# Patient Record
Sex: MALE | Race: BLACK OR AFRICAN AMERICAN | Employment: UNEMPLOYED | URBAN - METROPOLITAN AREA
[De-identification: names, ages, dates, MRNs, and addresses within clinical notes are randomized per-mention and may not be internally consistent; named-entity substitution may affect disease eponyms.]

---

## 2019-07-07 ENCOUNTER — HOSPITAL ENCOUNTER (EMERGENCY)
Age: 19
Discharge: HOME | End: 2019-07-07
Attending: EMERGENCY MEDICINE
Payer: COMMERCIAL

## 2019-07-07 VITALS
HEART RATE: 85 BPM | SYSTOLIC BLOOD PRESSURE: 119 MMHG | TEMPERATURE: 98.9 F | OXYGEN SATURATION: 99 % | RESPIRATION RATE: 15 BRPM | DIASTOLIC BLOOD PRESSURE: 77 MMHG | WEIGHT: 170 LBS

## 2019-07-07 DIAGNOSIS — S61.412A LACERATION OF LEFT HAND WITHOUT FOREIGN BODY, INITIAL ENCOUNTER: Primary | ICD-10-CM

## 2019-07-07 PROCEDURE — 4500000022 HC ED LEVEL 2 PROCEDURE

## 2019-07-07 PROCEDURE — 99282 EMERGENCY DEPT VISIT SF MDM: CPT

## 2019-07-07 RX ORDER — LIDOCAINE HYDROCHLORIDE AND EPINEPHRINE 10; 10 MG/ML; UG/ML
INJECTION, SOLUTION INFILTRATION; PERINEURAL
Status: DISCONTINUED
Start: 2019-07-07 | End: 2019-07-08 | Stop reason: HOSPADM

## 2019-07-07 RX ORDER — LIDOCAINE HYDROCHLORIDE AND EPINEPHRINE 10; 10 MG/ML; UG/ML
20 INJECTION, SOLUTION INFILTRATION; PERINEURAL ONCE
Status: DISCONTINUED | OUTPATIENT
Start: 2019-07-07 | End: 2019-07-08 | Stop reason: HOSPADM

## 2019-07-07 SDOH — HEALTH STABILITY: MENTAL HEALTH: HOW OFTEN DO YOU HAVE A DRINK CONTAINING ALCOHOL?: NEVER

## 2019-07-08 NOTE — ED PROVIDER NOTES
Date ofevaluation: 7/7/2019    Chief Complaint   Hand Laceration (left hand)      Nursing Notes, Past Medical Hx, Past Surgical Hx, Social Hx, Allergies, and Family Hx were reviewed. History of Present Illness     Igor Troncoso III is a 22 y.o. male who presents to the emergency department due to a left hand laceration. The patient states that he was play fighting with his friend and they fell through a glass window and he cut his left hand. It began bleeding but resolved with pressure. He denies any loss of sensation in the digits of the left hand. He is right-hand dominant. He states that he had a tetanus booster in 2017 when he started college. He denies any other injuries or medical problems. Review of Systems     ROS:  A full 10-point review of systems was conducted and is otherwise negative unless noted above in HPI. Past Medical, Surgical, Family, and Social History     History reviewed. No pertinent past medical history. Procedure Laterality Date    INGUINAL HERNIA [de-identified]      2year old, unsure side     TONSILLECTOMY  2000     His family history includes Diabetes in his maternal grandmother. He reports that he has been smoking cigars. He has never used smokeless tobacco. He reports that he does not drink alcohol or use drugs. Medications     Previous Medications    No medications on file       Allergies     He has No Known Allergies.     Physical Exam     INITIAL VITALS: /77   Pulse 85   Temp 98.9 °F (37.2 °C) (Oral)   Resp 15   Wt 170 lb (77.1 kg)   SpO2 99%    Physical Exam  General:  well nourished; well developed; in no apparent distress   HEENT:  NCAT; PERRL; sclera anicteric, conjunctiva pink; MMM, no oropharyngeal erythema lesions   Neck:  no meningismus; trachea midline   Pulmonary:  Clear to auscultation bilaterally, no respiratory distress; no wheezes or rales   Cardiac:  regular rate and rhythm, no murmurs, rubs, or gallops   Abdomen:  Soft, non-tender,

## 2020-10-06 ENCOUNTER — HOSPITAL ENCOUNTER (EMERGENCY)
Age: 20
Discharge: HOME | End: 2020-10-06
Payer: COMMERCIAL

## 2020-10-06 VITALS
DIASTOLIC BLOOD PRESSURE: 72 MMHG | RESPIRATION RATE: 16 BRPM | HEIGHT: 66 IN | WEIGHT: 150 LBS | SYSTOLIC BLOOD PRESSURE: 115 MMHG | TEMPERATURE: 97.9 F | HEART RATE: 76 BPM | BODY MASS INDEX: 24.11 KG/M2 | OXYGEN SATURATION: 98 %

## 2020-10-06 PROCEDURE — 99283 EMERGENCY DEPT VISIT LOW MDM: CPT

## 2020-10-06 PROCEDURE — 87491 CHLMYD TRACH DNA AMP PROBE: CPT

## 2020-10-06 PROCEDURE — 6370000000 HC RX 637 (ALT 250 FOR IP): Performed by: PHYSICIAN ASSISTANT

## 2020-10-06 PROCEDURE — 96372 THER/PROPH/DIAG INJ SC/IM: CPT

## 2020-10-06 RX ORDER — AZITHROMYCIN 250 MG/1
1000 TABLET, FILM COATED ORAL ONCE
Status: COMPLETED | OUTPATIENT
Start: 2020-10-06 | End: 2020-10-06

## 2020-10-06 RX ORDER — METRONIDAZOLE 500 MG/1
2000 TABLET ORAL ONCE
Status: COMPLETED | OUTPATIENT
Start: 2020-10-06 | End: 2020-10-06

## 2020-10-06 RX ADMIN — METRONIDAZOLE 2000 MG: 500 TABLET ORAL at 15:46

## 2020-10-06 RX ADMIN — AZITHROMYCIN MONOHYDRATE 1000 MG: 250 TABLET ORAL at 15:46

## 2020-10-06 ASSESSMENT — ENCOUNTER SYMPTOMS
COUGH: 0
NAUSEA: 0
EYE REDNESS: 0
ABDOMINAL PAIN: 0
VOMITING: 0
SHORTNESS OF BREATH: 0
DIARRHEA: 0

## 2020-10-06 NOTE — ED PROVIDER NOTES
[facility]  EMERGENCY DEPARTMENT ENCOUNTER          PHYSICIAN ASSISTANT NOTE       Date of evaluation: 10/6/2020    Chief Complaint     Exposure to STD      History of Present Illness     Leverette Lesches III is a 23 y.o. male with no significant medical history who presents with exposure to STD. Patient states that he was informed by his girlfriend of 4 months that she tested positive for chlamydia at her routine OB appointment. He states that he is currently asymptomatic. He denies any dysuria, penile discharge, female pain or testicular pain, rash, abdominal pain, nausea or vomiting, change in bowel or urinary habits, fever. No upper respiratory symptoms. He states that he has had sexual intercourse with 2 other females this month and does not routinely use condoms. He denies any recent STD testing. Review of Systems     Review of Systems   Constitutional: Negative for chills and fever. Eyes: Negative for redness. Respiratory: Negative for cough and shortness of breath. Cardiovascular: Negative for chest pain. Gastrointestinal: Negative for abdominal pain, diarrhea, nausea and vomiting. Genitourinary: Negative for difficulty urinating, discharge, dysuria and genital sores. Musculoskeletal: Negative for gait problem. Skin: Negative for wound. Neurological: Negative for dizziness. Psychiatric/Behavioral: The patient is not nervous/anxious. Past Medical, Surgical, Family, and Social History     He has no past medical history on file. He has a past surgical history that includes Tonsillectomy (2000) and Inguinal hernia repair. His family history includes Diabetes in his maternal grandmother. He reports that he has quit smoking. His smoking use included cigars. He has never used smokeless tobacco. He reports current alcohol use. He reports that he does not use drugs.     Medications     Previous Medications    No medications on file       Allergies     He has No Known Allergies. Physical Exam     INITIAL VITALS: BP: 115/72,Temp: 97.9 °F (36.6 °C), Pulse: 76, Resp: 16, SpO2: 98 %   Physical Exam  Vitals signs and nursing note reviewed. Exam conducted with a chaperone present. Constitutional:       General: He is not in acute distress. HENT:      Head: Normocephalic and atraumatic. Mouth/Throat:      Mouth: Mucous membranes are moist.      Pharynx: Oropharynx is clear. Eyes:      Extraocular Movements: Extraocular movements intact. Conjunctiva/sclera: Conjunctivae normal.   Neck:      Musculoskeletal: Neck supple. Cardiovascular:      Rate and Rhythm: Normal rate and regular rhythm. Pulmonary:      Effort: Pulmonary effort is normal. No respiratory distress. Breath sounds: Normal breath sounds. No wheezing, rhonchi or rales. Abdominal:      General: Bowel sounds are normal. There is no distension. Palpations: Abdomen is soft. Tenderness: There is no abdominal tenderness. There is no guarding or rebound. Genitourinary:     Penis: Circumcised. Scrotum/Testes:         Right: Tenderness or swelling not present. Right testis is descended. Left: Tenderness or swelling not present. Left testis is descended. Musculoskeletal:         General: No deformity. Lymphadenopathy:      Lower Body: No right inguinal adenopathy. No left inguinal adenopathy. Skin:     General: Skin is warm and dry. Neurological:      Mental Status: He is alert and oriented to person, place, and time. Psychiatric:         Mood and Affect: Mood normal.         Behavior: Behavior normal.         Diagnostic Results     RADIOLOGY:  No orders to display       LABS:   No results found for this visit on 10/06/20. RECENT VITALS:  BP: 115/72, Temp: 97.9 °F (36.6 °C), Pulse: 76,Resp: 16, SpO2: 98 %     Procedures         ED Course     Nursing Notes, Past Medical Hx, Past Surgical Hx, Social Hx, Allergies, and Family Hx were reviewed.     The patient was given the followingmedications:  Orders Placed This Encounter   Medications    azithromycin (ZITHROMAX) tablet 1,000 mg     Order Specific Question:   Antimicrobial Indications     Answer:   STD infection    cefTRIAXone (ROCEPHIN) 250 mg in lidocaine 1 % 1 mL IM Injection     Order Specific Question:   Antimicrobial Indications     Answer:   STD infection    metroNIDAZOLE (FLAGYL) tablet 2,000 mg     Order Specific Question:   Antimicrobial Indications     Answer:   STD infection       CONSULTS:  None    MEDICAL DECISION MAKING / ASSESSMENT / Marissa Deirdre MATSON is a 23 y.o. male with no significant past medical history who presents to the emergency department with concern for STD. Patient states that his girlfriend informed him that she was positive for chlamydia at a routine OB appointment. Patient is currently asymptomatic. He reports sexual intercourse with 2 other females without the routine use of condoms. His physical exam is unremarkable. GC/chlamydia urine specimen was sent. Patient will be empirically treated for gonorrhea, chlamydia, and trichomonas. He was instructed to refrain from further sexual intercourse for approximately 2 weeks. He is instructed to inform all recent sexual partners of the need for treatment. He is referred to the local health department, [facility],  emergency department or primary care for further STD testing including HIV, hepatitis C, syphilis. Patient is stable for discharge home at this time. Clinical Impression     1.  Urethritis        Disposition     PATIENT REFERRED TO:  [facility]  [address] [place] [address]  [phone]    If symptoms worsen      DISCHARGE MEDICATIONS:  New Prescriptions    No medications on file        DISPOSITION Discharge - Pending Orders Complete 10/06/2020 03:29:59 PM       Lieutenant Ortega PA-C  10/06/20 1536

## 2020-10-07 LAB — C. TRACHOMATIS DNA ,URINE: NEGATIVE

## 2021-04-24 ENCOUNTER — HOSPITAL ENCOUNTER (EMERGENCY)
Age: 21
Discharge: HOME | End: 2021-04-24
Payer: COMMERCIAL

## 2021-04-24 VITALS
WEIGHT: 165 LBS | TEMPERATURE: 97.8 F | BODY MASS INDEX: 26.63 KG/M2 | DIASTOLIC BLOOD PRESSURE: 77 MMHG | HEART RATE: 82 BPM | SYSTOLIC BLOOD PRESSURE: 115 MMHG | OXYGEN SATURATION: 98 % | RESPIRATION RATE: 16 BRPM

## 2021-04-24 DIAGNOSIS — H66.001 ACUTE SUPPURATIVE OTITIS MEDIA OF RIGHT EAR WITHOUT SPONTANEOUS RUPTURE OF TYMPANIC MEMBRANE, RECURRENCE NOT SPECIFIED: ICD-10-CM

## 2021-04-24 DIAGNOSIS — S00.451A EMBEDDED EARRING OF RIGHT EAR, INITIAL ENCOUNTER: ICD-10-CM

## 2021-04-24 DIAGNOSIS — J30.9 ALLERGIC RHINITIS, UNSPECIFIED SEASONALITY, UNSPECIFIED TRIGGER: Primary | ICD-10-CM

## 2021-04-24 PROCEDURE — 99282 EMERGENCY DEPT VISIT SF MDM: CPT

## 2021-04-24 RX ORDER — FLUTICASONE PROPIONATE 50 MCG
1 SPRAY, SUSPENSION (ML) NASAL DAILY
Qty: 1 BOTTLE | Refills: 0 | Status: SHIPPED | OUTPATIENT
Start: 2021-04-24 | End: 2021-04-24 | Stop reason: SDUPTHER

## 2021-04-24 RX ORDER — AMOXICILLIN 500 MG/1
500 CAPSULE ORAL 2 TIMES DAILY
Qty: 20 CAPSULE | Refills: 0 | Status: SHIPPED | OUTPATIENT
Start: 2021-04-24 | End: 2021-05-04

## 2021-04-24 RX ORDER — FLUTICASONE PROPIONATE 50 MCG
1 SPRAY, SUSPENSION (ML) NASAL DAILY
Qty: 1 BOTTLE | Refills: 0 | Status: SHIPPED | OUTPATIENT
Start: 2021-04-24

## 2021-04-24 RX ORDER — CETIRIZINE HYDROCHLORIDE 10 MG/1
10 TABLET ORAL DAILY
Qty: 30 TABLET | Refills: 0 | Status: SHIPPED | OUTPATIENT
Start: 2021-04-24 | End: 2021-05-24

## 2021-04-24 RX ORDER — CETIRIZINE HYDROCHLORIDE 10 MG/1
10 TABLET ORAL DAILY
Qty: 30 TABLET | Refills: 0 | Status: SHIPPED | OUTPATIENT
Start: 2021-04-24 | End: 2021-04-24 | Stop reason: SDUPTHER

## 2021-04-24 RX ORDER — AMOXICILLIN 500 MG/1
500 CAPSULE ORAL 2 TIMES DAILY
Qty: 20 CAPSULE | Refills: 0 | Status: SHIPPED | OUTPATIENT
Start: 2021-04-24 | End: 2021-04-24 | Stop reason: SDUPTHER

## 2021-04-24 ASSESSMENT — ENCOUNTER SYMPTOMS
SHORTNESS OF BREATH: 0
COUGH: 1
NAUSEA: 0
ABDOMINAL PAIN: 0
RHINORRHEA: 0
DIARRHEA: 0
VOMITING: 0

## 2021-04-24 NOTE — ED PROVIDER NOTES
[facility]        Pt Name: Dedrick Peraza III  MRN: [id number]  [place] 2000  Date of evaluation: 4/24/2021  Provider: Gael Cheney PA-C  PCP: No primary care provider on file. [facility]. I have evaluated this patient. My supervising physician was available for consultation. CHIEF COMPLAINT       Chief Complaint   Patient presents with   Eleanor Guerrero stuck in Right ear       HISTORY OF PRESENT ILLNESS   (Location, Timing/Onset, Context/Setting, Quality, Duration, Modifying Factors, Severity, Associated Signs and Symptoms)  Note limiting factors. Blanka Brandt is a 23 y.o. male who presents to the emergency department today for evaluation for right ear pain. Patient states that he has been experiencing pain to his right ear, and he states has been there for approximately 1 week. The patient states that he has pain to the inside of his ear, and also to his earlobe, and he believes that his hearing may be stuck in his ear. The patient denies any drainage from the ear. He has not had any fever or chills. He states that he has had some nasal congestion a dry intermittent cough for the past several days as well. He states that he does have a history of seasonal allergies however he is not on any medications. The patient does not have any chest pain or shortness of breath. He denies any abdominal pain, nausea, vomiting or diarrhea. He is no urinary symptoms. Patient otherwise has no complaints at this time. Nursing Notes were all reviewed and agreed with or any disagreements were addressed in the HPI. REVIEW OF SYSTEMS    (2-9 systems for level 4, 10 or more for level 5)     Review of Systems   Constitutional: Negative for activity change, appetite change, chills and fever. HENT: Positive for congestion and ear pain. Negative for rhinorrhea. Respiratory: Positive for cough. Negative for shortness of breath. Cardiovascular: Negative for chest pain. Gastrointestinal: Negative for abdominal pain, diarrhea, nausea and vomiting. Genitourinary: Negative for difficulty urinating, dysuria and hematuria. Neurological: Negative for weakness. Positives and Pertinent negatives as per HPI. Except as noted above in the ROS, all other systems were reviewed and negative. PAST MEDICAL HISTORY   History reviewed. No pertinent past medical history. SURGICAL HISTORY     Past Surgical History:   Procedure Laterality Date    INGUINAL HERNIA [de-identified]      2year old, unsure side     TONSILLECTOMY  2000         [address]       Discharge Medication List as of 4/24/2021 11:12 AM            ALLERGIES     Patient has no known allergies. FAMILYHISTORY       Family History   Problem Relation Age of Onset    Diabetes Maternal Grandmother           SOCIAL HISTORY       Social History     Tobacco Use    Smoking status: Former Smoker     Types: Cigars    Smokeless tobacco: Never Used    Tobacco comment: FT's 10 a week   Substance Use Topics    Alcohol use: Yes     Frequency: Never     Comment: socially    Drug use: Never       SCREENINGS             PHYSICAL EXAM    (up to 7 for level 4, 8 or more for level 5)     ED Triage Vitals [04/24/21 1058]   BP Temp Temp src Pulse Resp SpO2 Height Weight   115/77 97.8 °F (36.6 °C) -- 82 16 98 % -- 165 lb (74.8 kg)       Physical Exam  Vitals signs and nursing note reviewed. Constitutional:       Appearance: He is well-developed. He is not diaphoretic. HENT:      Head: Normocephalic and atraumatic. Right Ear: Ear canal and external ear normal.      Left Ear: Tympanic membrane, ear canal and external ear normal.      Ears:      Comments: Right TM is erythematous and dull with loss of light reflex, no bulging.   No perforation there is no erythema, warmth or edema noted over the mastoid    Patient does have an earring noted to his earlobe to the right ear, easily removed by myself. The back of the earring was minimally embedded into the ear. Nose: Congestion and rhinorrhea present. Eyes:      General:         Right eye: No discharge. Left eye: No discharge. Extraocular Movements: Extraocular movements intact. Conjunctiva/sclera: Conjunctivae normal.      Pupils: Pupils are equal, round, and reactive to light. Neck:      Musculoskeletal: Normal range of motion and neck supple. Trachea: No tracheal deviation. Cardiovascular:      Rate and Rhythm: Normal rate and regular rhythm. Heart sounds: No murmur. Pulmonary:      Effort: Pulmonary effort is normal. No respiratory distress. Breath sounds: Normal breath sounds. No wheezing or rales. Musculoskeletal: Normal range of motion. Skin:     General: Skin is warm and dry. Neurological:      Mental Status: He is alert and oriented to person, place, and time. Psychiatric:         Behavior: Behavior normal.         DIAGNOSTIC RESULTS   LABS:    Labs Reviewed - No data to display    All other labs were within normal range or not returned as of this dictation. EKG: All EKG's are interpreted by the Emergency Department Physician in the absence of a cardiologist.  Please see their note for interpretation of EKG. RADIOLOGY:   Non-plain film images such as CT, Ultrasound and MRI are read by the radiologist. Plain radiographic images are visualized and preliminarily interpreted by the ED Provider with the below findings:        Interpretation per the Radiologist below, if available at the time of this note:    No orders to display     No results found.         PROCEDURES   Unless otherwise noted below, none     Procedures    CRITICAL CARE TIME   N/A    CONSULTS:  None      EMERGENCY DEPARTMENT COURSE and DIFFERENTIAL DIAGNOSIS/MDM:   Vitals:    Vitals:    04/24/21 1058   BP: 115/77   Pulse: 82   Resp: 16   Temp: 97.8 °F (36.6 °C)   SpO2: 98%   Weight: 165 lb (74.8 kg)       Patient was given the following medications:  Medications - No data to display        Briefly, this is a 65-year-old male who presents emergency of her main today for evaluation for ear pain. The patient states that he has been experiencing pain to his right ear, and he states that this is actually been ongoing for 1 week. He states that the pain is to the inside of his ear, and he states that he also has pain to his earlobe, is concerned that his hearing may be stuck. Patient does have congestion, and rhinorrhea on exam, he states he does have a history of seasonal allergies however he is not on any medications at this time patient will be given a prescription for Zyrtec as well as Flonase for home. The right TM is actually erythematous, and dull with loss of light reflex, as he has been complaining of pain inside of his right ear for approximately 1 week we will treat with amoxicillin for possible otitis media. Patient does have an earring noted to the right earlobe, the back of the earring is minimally embedded into the earlobe, I was easily able to unscrew the back of the earring and remove the earring without any difficulty. Supportive care discussed at home, he was referred to [facility] for follow-up within 2 to 3 days for reevaluation. He is to return to the ED for any new or worsening symptoms, the patient voiced understanding is agreeable with plan. Stable for discharge. My suspicion is low at this time for perforation, otitis externa, malignant otitis externa, mastoiditis, osteomyelitis or other emergent etiology      FINAL IMPRESSION      1. Allergic rhinitis, unspecified seasonality, unspecified trigger    2.  Acute suppurative otitis media of right ear without spontaneous rupture of tympanic membrane, recurrence not specified    3. Embedded earring of right ear, initial encounter          DISPOSITION/PLAN   DISPOSITION Decision To Discharge 04/24/2021 11:20:17 AM      PATIENT

## 2023-07-23 ENCOUNTER — HOSPITAL ENCOUNTER (EMERGENCY)
Age: 23
Discharge: HOME | End: 2023-07-23
Attending: EMERGENCY MEDICINE
Payer: COMMERCIAL

## 2023-07-23 ENCOUNTER — APPOINTMENT (OUTPATIENT)
Dept: CT IMAGING | Age: 23
End: 2023-07-23
Payer: COMMERCIAL

## 2023-07-23 VITALS
TEMPERATURE: 98.5 F | RESPIRATION RATE: 16 BRPM | DIASTOLIC BLOOD PRESSURE: 100 MMHG | SYSTOLIC BLOOD PRESSURE: 150 MMHG | HEART RATE: 93 BPM | OXYGEN SATURATION: 96 %
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
## 2023-07-23 DIAGNOSIS — S01.81XA FACIAL LACERATION, INITIAL ENCOUNTER: ICD-10-CM

## 2023-07-23 PROCEDURE — 12054 INTMD RPR FACE/MM 7.6-12.5CM: CPT

## 2023-07-23 PROCEDURE — 90714 TD VACC NO PRESV 7 YRS+ IM: CPT | Performed by: NURSE PRACTITIONER

## 2023-07-23 PROCEDURE — 70450 CT HEAD/BRAIN W/O DYE: CPT

## 2023-07-23 PROCEDURE — 90471 IMMUNIZATION ADMIN: CPT | Performed by: NURSE PRACTITIONER

## 2023-07-23 PROCEDURE — 6360000002 HC RX W HCPCS: Performed by: NURSE PRACTITIONER

## 2023-07-23 PROCEDURE — 99284 EMERGENCY DEPT VISIT MOD MDM: CPT

## 2023-07-23 PROCEDURE — 70486 CT MAXILLOFACIAL W/O DYE: CPT

## 2023-07-23 RX ADMIN — CLOSTRIDIUM TETANI TOXOID ANTIGEN (FORMALDEHYDE INACTIVATED) AND CORYNEBACTERIUM DIPHTHERIAE TOXOID ANTIGEN (FORMALDEHYDE INACTIVATED) 0.5 ML: 5; 2 INJECTION, SUSPENSION INTRAMUSCULAR at 20:48

## 2023-07-23 ASSESSMENT — ENCOUNTER SYMPTOMS
ABDOMINAL PAIN: 0
CHEST TIGHTNESS: 0
DIARRHEA: 0
VOMITING: 0
NAUSEA: 0
SHORTNESS OF BREATH: 0
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
## 2023-07-24 NOTE — DISCHARGE INSTRUCTIONS
Patient is medically appropriate for senior care tonight    Please keep the wound covered.     Sutures should be removed in 7-10 days
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
